# Patient Record
Sex: FEMALE | Race: WHITE | NOT HISPANIC OR LATINO | Employment: FULL TIME | URBAN - NONMETROPOLITAN AREA
[De-identification: names, ages, dates, MRNs, and addresses within clinical notes are randomized per-mention and may not be internally consistent; named-entity substitution may affect disease eponyms.]

---

## 2024-10-30 ENCOUNTER — OFFICE VISIT (OUTPATIENT)
Dept: CARDIOLOGY | Facility: CLINIC | Age: 69
End: 2024-10-30
Payer: MEDICARE

## 2024-10-30 VITALS
HEIGHT: 62 IN | WEIGHT: 116 LBS | SYSTOLIC BLOOD PRESSURE: 114 MMHG | BODY MASS INDEX: 21.35 KG/M2 | HEART RATE: 74 BPM | DIASTOLIC BLOOD PRESSURE: 64 MMHG

## 2024-10-30 DIAGNOSIS — R53.83 FATIGUE, UNSPECIFIED TYPE: ICD-10-CM

## 2024-10-30 DIAGNOSIS — Z78.9 NEVER SMOKED TOBACCO: ICD-10-CM

## 2024-10-30 DIAGNOSIS — R06.02 SHORTNESS OF BREATH: ICD-10-CM

## 2024-10-30 DIAGNOSIS — R00.2 PALPITATIONS: ICD-10-CM

## 2024-10-30 PROCEDURE — 1036F TOBACCO NON-USER: CPT | Performed by: INTERNAL MEDICINE

## 2024-10-30 PROCEDURE — 1159F MED LIST DOCD IN RCRD: CPT | Performed by: INTERNAL MEDICINE

## 2024-10-30 PROCEDURE — 99204 OFFICE O/P NEW MOD 45 MIN: CPT | Performed by: INTERNAL MEDICINE

## 2024-10-30 PROCEDURE — 93000 ELECTROCARDIOGRAM COMPLETE: CPT | Performed by: INTERNAL MEDICINE

## 2024-10-30 PROCEDURE — 3008F BODY MASS INDEX DOCD: CPT | Performed by: INTERNAL MEDICINE

## 2024-10-30 RX ORDER — BISMUTH SUBSALICYLATE 262 MG
1 TABLET,CHEWABLE ORAL DAILY
COMMUNITY

## 2024-10-30 RX ORDER — PERPHENAZINE 4 MG
1 TABLET ORAL DAILY
COMMUNITY

## 2024-10-30 RX ORDER — FAMOTIDINE 20 MG/1
20 TABLET, FILM COATED ORAL DAILY
COMMUNITY

## 2024-10-30 RX ORDER — ACETAMINOPHEN 500 MG
1 TABLET ORAL DAILY
COMMUNITY

## 2024-10-30 ASSESSMENT — ENCOUNTER SYMPTOMS
IRREGULAR HEARTBEAT: 1
DYSPNEA ON EXERTION: 1
PALPITATIONS: 1

## 2024-11-06 ENCOUNTER — HOSPITAL ENCOUNTER (OUTPATIENT)
Dept: CARDIOLOGY | Facility: CLINIC | Age: 69
Discharge: HOME | End: 2024-11-06
Payer: MEDICARE

## 2024-11-06 ENCOUNTER — APPOINTMENT (OUTPATIENT)
Dept: CARDIOLOGY | Facility: CLINIC | Age: 69
End: 2024-11-06
Payer: MEDICARE

## 2024-11-06 VITALS — HEART RATE: 59 BPM | DIASTOLIC BLOOD PRESSURE: 86 MMHG | SYSTOLIC BLOOD PRESSURE: 142 MMHG

## 2024-11-06 VITALS
DIASTOLIC BLOOD PRESSURE: 68 MMHG | BODY MASS INDEX: 21.9 KG/M2 | HEIGHT: 61 IN | SYSTOLIC BLOOD PRESSURE: 112 MMHG | WEIGHT: 116 LBS

## 2024-11-06 DIAGNOSIS — R06.02 SHORTNESS OF BREATH: ICD-10-CM

## 2024-11-06 DIAGNOSIS — R53.83 FATIGUE, UNSPECIFIED TYPE: ICD-10-CM

## 2024-11-06 DIAGNOSIS — R00.2 PALPITATIONS: ICD-10-CM

## 2024-11-06 LAB
AORTIC VALVE MEAN GRADIENT: 2 MMHG
AORTIC VALVE PEAK VELOCITY: 1.01 M/S
AV PEAK GRADIENT: 4 MMHG
AVA (PEAK VEL): 2.81 CM2
AVA (VTI): 2.7 CM2
BODY SURFACE AREA: 1.5 M2
EJECTION FRACTION APICAL 4 CHAMBER: 56
EJECTION FRACTION: 63 %
LEFT VENTRICLE INTERNAL DIMENSION DIASTOLE: 4.32 CM (ref 3.5–6)
LEFT VENTRICULAR OUTFLOW TRACT DIAMETER: 2.1 CM
LV EJECTION FRACTION BIPLANE: 58 %
MITRAL VALVE E/A RATIO: 0.82
MITRAL VALVE E/E' RATIO: 6.7
RIGHT VENTRICLE PEAK SYSTOLIC PRESSURE: 26.4 MMHG

## 2024-11-06 PROCEDURE — 93018 CV STRESS TEST I&R ONLY: CPT | Performed by: INTERNAL MEDICINE

## 2024-11-06 PROCEDURE — 93306 TTE W/DOPPLER COMPLETE: CPT

## 2024-11-06 PROCEDURE — 93016 CV STRESS TEST SUPVJ ONLY: CPT | Performed by: INTERNAL MEDICINE

## 2024-11-06 PROCEDURE — 93017 CV STRESS TEST TRACING ONLY: CPT

## 2024-11-06 PROCEDURE — 93306 TTE W/DOPPLER COMPLETE: CPT | Performed by: INTERNAL MEDICINE

## 2024-11-07 ENCOUNTER — TELEPHONE (OUTPATIENT)
Dept: CARDIOLOGY | Facility: CLINIC | Age: 69
End: 2024-11-07
Payer: MEDICARE

## 2024-11-07 NOTE — TELEPHONE ENCOUNTER
----- Message from Mary Cherry sent at 11/6/2024 11:13 PM EST -----  Let her know the stress test is ok  ----- Message -----  From: Miranda, Syngo - Cardiology Results In  Sent: 11/6/2024   7:37 PM EST  To: Mary Cherry MD

## 2024-11-07 NOTE — TELEPHONE ENCOUNTER
Result Communication    Resulted Orders   Transthoracic Echo Complete   Result Value Ref Range    AV mn grad 2 mmHg    AV pk talya 1.01 m/s    LV Biplane EF 58 %    LVOT diam 2.10 cm    MV E/A ratio 0.82     MV avg E/e' ratio 6.70     LV EF 63 %    LVIDd 4.32 cm    RVSP 26.4 mmHg    AV pk grad 4 mmHg    Aortic Valve Area by Continuity of Peak Velocity 2.81 cm2    Aortic Valve Area by Continuity of VTI 2.70 cm2    LV A4C EF 56.0     Narrative                   [address], [address] [address]          Tel [phone] Fax [phone]    TRANSTHORACIC ECHOCARDIOGRAM REPORT    Patient Name:         MENDEZ Garcia Physician:    [id number] Jaylan Cherry MD,                                                                   [facility]  Study Date:           11/6/2024            Ordering Provider:    [id number] JAYLAN CHERRY  MRN/PID:              [id number]             Fellow:  Accession#:           [id number]         Nurse:  Date of Birth/Age:    1955 / 69 years Sonographer:          SANGEETA  Gender Assigned at    F                    Additional Staff:  Birth:  Height:               154.94 cm            Admit Date:  Weight:               52.62 kg             Admission Status:  BSA / BMI:            1.50 m2 / 21.92      Department Location:  [facility]                        kg/m2                                      [facility] [place]  Blood Pressure: 112 /68 mmHg    Study Type:    TRANSTHORACIC ECHO (TTE) COMPLETE  Diagnosis/ICD: Shortness of breath-R06.02; Other fatigue-R53.83  Indication:    Palpitations, Fibromyalgia  CPT Codes:     Echo Complete w Full Doppler-49909   Study Detail: The following Echo studies were performed: 2D, M-Mode, Doppler and                color flow.       PHYSICIAN INTERPRETATION:  Left Ventricle: Left ventricular ejection  fraction is normal, by visual estimate at 60-65%. There are no regional wall motion abnormalities. The left ventricular cavity size is normal. There is mild increased septal and normal posterior left ventricular wall thickness. Spectral Doppler shows a Grade I (impaired relaxation pattern) of left ventricular diastolic filling with normal left atrial filling pressure.  Left Atrium: The left atrium is normal in size.  Right Ventricle: The right ventricle is normal in size. There is normal right ventricular global systolic function.  Right Atrium: The right atrium is normal in size.  Aortic Valve: The aortic valve is trileaflet. The aortic valve dimensionless index is 0.78. There is trace to mild aortic valve regurgitation. The peak instantaneous gradient of the aortic valve is 4 mmHg. The mean gradient of the aortic valve is 2 mmHg.  Mitral Valve: The mitral valve is normal in structure. The peak instantaneous gradient of the mitral valve is 3 mmHg. There is trace to mild mitral valve regurgitation.  Tricuspid Valve: The tricuspid valve is structurally normal. There is trace tricuspid regurgitation.  Pulmonic Valve: The pulmonic valve is structurally normal. There is no indication of pulmonic valve regurgitation.  Pericardium: No pericardial effusion noted.  Aorta: The aortic root is normal.  Pulmonary Artery: The Doppler estimated pulmonary artery diastolic pressure is 10.6 mmHg.  Systemic Veins: The inferior vena cava appears normal in size.       CONCLUSIONS:   1. Left ventricular ejection fraction is normal, by visual estimate at 60-65%.   2. There is normal right ventricular global systolic function.   3. Trace to mild mitral valve regurgitation.    QUANTITATIVE DATA SUMMARY:     2D MEASUREMENTS:           Normal Ranges:  Ao Root d:       2.80 cm   (2.0-3.7cm)  LAs:             3.30 cm   (2.7-4.0cm)  RVIDd:           3.24 cm   (0.9-3.6cm)  IVSd:            0.91 cm   (0.6-1.1cm)  LVPWd:           0.82 cm    (0.6-1.1cm)  LVIDd:           4.32 cm   (3.9-5.9cm)  LVIDs:           3.05 cm  LV Mass Index:   78.2 g/m2  LV % FS          29.4 %       LV SYSTOLIC FUNCTION BY 2D PLANIMETRY (MOD):                       Normal Ranges:  EF-A4C View:    56 % (>=55%)  EF-A2C View:    58 %  EF-Biplane:     58 %  EF-Visual:      63 %  LV EF Reported: 63 %       LV DIASTOLIC FUNCTION:           Normal Ranges:  MV Peak E:             0.61 m/s  (0.7-1.2 m/s)  MV Peak A:             0.74 m/s  (0.42-0.7 m/s)  E/A Ratio:             0.82      (1.0-2.2)  MV e'                  0.079 m/s (>8.0)  MV lateral e'          0.09 m/s  MV medial e'           0.07 m/s  E/e' Ratio:            7.67      (<8.0)       MITRAL VALVE:          Normal Ranges:  MV Vmax:      0.83 m/s (<=1.3m/s)  MV peak P.7 mmHg (<5mmHg)  MV mean P.0 mmHg (<48mmHg)       MITRAL INSUFFICIENCY:             Normal Ranges:  MR Vmax:              406.00 cm/s       AORTIC VALVE:                     Normal Ranges:  AoV Vmax:                1.01 m/s (<=1.7m/s)  AoV Peak P.1 mmHg (<20mmHg)  AoV Mean P.0 mmHg (1.7-11.5mmHg)  LVOT Max Tato:            0.82 m/s (<=1.1m/s)  AoV VTI:                 21.30 cm (18-25cm)  LVOT VTI:                16.60 cm  LVOT Diameter:           2.10 cm  (1.8-2.4cm)  AoV Area, VTI:           2.70 cm2 (2.5-5.5cm2)  AoV Area,Vmax:           2.81 cm2 (2.5-4.5cm2)  AoV Dimensionless Index: 0.78       AORTIC INSUFFICIENCY:  AI Vmax:       2.74 m/s  AI Half-time:  491 msec  AI Decel Rate: 163.00 cm/s2       TRICUSPID VALVE/RVSP:          Normal Ranges:  Peak TR Velocity:     2.42 m/s  RV Syst Pressure:     26 mmHg  (< 30mmHg)       PULMONIC VALVE:           Normal Ranges:  PV Max Tato:     0.8 m/s   (0.6-0.9m/s)  PV Max P.3 mmHg  PIEDV:          1.38 m/s  PADP:           10.6 mmHg       [id number] Mary Cherry MD, [facility]  Electronically signed on 2024 at 5:45:26 PM         ** Final **

## 2024-11-07 NOTE — TELEPHONE ENCOUNTER
Left detailed message with normal results instructed to call back with any questions or concerns.

## 2024-11-07 NOTE — TELEPHONE ENCOUNTER
Result Communication    Resulted Orders   Stress Test    Narrative                   [facility]  [address] [place] [address], [address], [place], [place] [address]          Tel [phone] Fax [phone]    Exercise Stress Test    Patient Name:      MENDEZ FUENTES           Ordering Provider:     [id number] JAYLAN CARPENTER  Study Date:        11/6/2024            Reading Physician:     [id number] A Ruiz MD  MRN/PID:           [id number]             Supervising Physician: Cody Ruiz MD  Accession#:        [id number]         Fellow:  Date of Birth/Age: 1955 / 69 years Fellow:  Gender:            F                    Nurse:                 Leonie Kumar RN  Admission Status:                       Sonographer:           NA  Height:            157.48 cm            Technologist:  Weight:            52.62 kg             Additional Staff:  BSA:               1.52 m2              Encounter#:            [id number]  BMI:               21.22 kg/m2          Patient Location:    Study Type:    STRESS TEST ONLY  Diagnosis/ICD: Palpitations-R00.2; Shortness of breath-R06.02; Other                 fatigue-R53.83  Indication:    Dyspnea  CPT Codes:     Stress Test Interpretation-30705; Stress Test Supervision-67568    Falls Risk: Low: Patient has low risk for sustaining a fall; environmental safety interventions in place.     Study Details: Correct procedure and correct patient verified verbally.       Patient Performance: The patient exercised to stage III on a Lokesh protocol for 6 minutes and 16 seconds, achieving 7.30 METS. The peak heart rate achieved was 133 bpm, which was 88 % of the age predicted target heart rate of 152 bpm. The resting blood pressure was 142/86 mmHg with a heart rate of 59 bpm. The standing blood  pressure was 136/86 mmHg with a heart rate of 62 bpm. The patient's functional capacity was average. The patient developed leg fatigue during the stress exam. The symptoms resolved with rest. The blood pressure response was normal. The test was terminated due to: leg fatigue and musculoskeletal weakness. Sinus tachycardia with nondiagnostic ST-T changes.     Double Product (HR x BP): 205.       Baseline ECG: Normal sinus rhythm.     Stress Stage Data:  +-----------------+---+------+-------+                   HR Sys BPDias BP  +-----------------+---+------+-------+  Baseline Resting 59 142   86       +-----------------+---+------+-------+  Baseline [id number] 136   86       +-----------------+---+------+-------+  Stage I          239691   82       +-----------------+---+------+-------+  Stage II         267679   78       +-----------------+---+------+-------+  Stage III        430410   80       +-----------------+---+------+-------+       Recovery ECG: The heart rate recovery was normal.     +------------+---+------+-------+              HR Sys BPDias BP  +------------+---+------+-------+  Recovery I  [id number]   80       +------------+---+------+-------+  Recovery II 99 156   74       +------------+---+------+-------+  Recovery III85 148   86       +------------+---+------+-------+  Recovery IV 77 128   84       +------------+---+------+-------+       Summary:   1. Graded exercise stress test with none diagnostic ST-T changes for ischemia.   2. No provoked chest pain or arrhythmia.   3. Appropriate hemodynamic response to exercise.   4. Fair exercise tolerance.   5. Normal heart recovery phase.   6. Patient was able to exercise for a total of 6-minute 16 seconds achieving workload of 7.3 METS and 87% of maximum predicted heart rate.   7. Adequate level of stress achieved.    [id number] Sarmad Ruiz MD  Electronically signed on 11/6/2024 at 7:37:31 PM                       ** Final **

## 2024-11-07 NOTE — TELEPHONE ENCOUNTER
----- Message from Mary Cherry sent at 11/6/2024 11:12 PM EST -----  Let her know the echo came back unremarkable  ----- Message -----  From: Interface, Syngo - Cardiology Results In  Sent: 11/6/2024   5:45 PM EST  To: Mary Cherry MD

## 2024-11-12 LAB — BODY SURFACE AREA: 1.5 M2

## 2024-11-12 PROCEDURE — 93227 XTRNL ECG REC<48 HR R&I: CPT | Performed by: INTERNAL MEDICINE

## 2024-12-16 ENCOUNTER — TELEPHONE (OUTPATIENT)
Dept: CARDIOLOGY | Facility: CLINIC | Age: 69
End: 2024-12-16
Payer: MEDICARE

## 2024-12-16 DIAGNOSIS — R92.8 ABNORMAL MAMMOGRAM: ICD-10-CM

## 2024-12-16 NOTE — TELEPHONE ENCOUNTER
Patient was told after her mammogram to call our office done at [facility]. Patient was advised she has calcium deposits and cardiologist needs updated.     Will request results.

## 2024-12-17 NOTE — TELEPHONE ENCOUNTER
Dr. Mary Cherry MD mammogram in chart for review    Clerical please send the info out for the calcium score. Thank you

## 2024-12-17 NOTE — TELEPHONE ENCOUNTER
Patient phoned office to check on status of Dr. Mary Cherry MD recommendations following anbormal mammogram. Informed her he is recommending she have a screening test called a coronary calcium score to assess further. She verbalized understanding.     Order prepped and sent to Dr. Mary Cherry MD for signature.     Paper placed on Dr. Mary Cherry MD desk for signature as well.

## 2024-12-31 ENCOUNTER — TELEPHONE (OUTPATIENT)
Dept: CARDIOLOGY | Facility: CLINIC | Age: 69
End: 2024-12-31
Payer: MEDICARE

## 2024-12-31 NOTE — TELEPHONE ENCOUNTER
Patient phoned with information for her calcium score test.  imaging in [place], [place] will be performing the patient coronary calcium score, they were able to get her in quickly while she is there for the winter.     Phone [phone]    Scheduled-01/07/2025 @ 120pm    Will watch for results, she is going to have them fax to our office for Dr. Mary Cherry MD to review.

## 2025-01-13 NOTE — TELEPHONE ENCOUNTER
Patient phoned requesting update if Calcium Score results were received from [facility] in [place], [place]. Phone number is [phone]. Will request records and send to Dr. Mary Cherry MD when obtained.

## 2025-01-14 NOTE — TELEPHONE ENCOUNTER
Pt phones back requesting to know if calcium score results have been received yet. States she is heading back to [place] tomorrow for 1 month. Informed patient results are not in yet but have been requested and will go to Dr. Mary Cherry MD upon receiving.

## 2025-01-15 DIAGNOSIS — R93.1 ELEVATED CORONARY ARTERY CALCIUM SCORE: ICD-10-CM

## 2025-01-15 DIAGNOSIS — E78.5 DYSLIPIDEMIA: ICD-10-CM

## 2025-01-15 NOTE — TELEPHONE ENCOUNTER
----- Message from Mary Cherry sent at 1/15/2025 10:22 AM EST -----  Coronary calcium score over 600, even though we did a cardiac catheterization in 2023 with no significant disease.  This indicate that we need to prevent progression, her stress test was normal.  No need for further cardiac testing I recommend starting taking baby aspirin and rosuvastatin 20 mg daily with follow-up labs in 3 months  ----- Message -----  From: Melanie Peres  Sent: 1/15/2025   9:51 AM EST  To: Mary Cherry MD

## 2025-01-15 NOTE — TELEPHONE ENCOUNTER
Phoned patient with results. Verbalized understanding. Would like lab slip mailed to home address in [place].    [address]  [address]  [place], [place] [address]

## 2025-01-17 RX ORDER — NAPROXEN SODIUM 220 MG/1
81 TABLET, FILM COATED ORAL DAILY
Start: 2025-01-17 | End: 2026-01-17

## 2025-01-17 RX ORDER — ROSUVASTATIN CALCIUM 20 MG/1
20 TABLET, COATED ORAL DAILY
Qty: 90 TABLET | Refills: 3 | Status: SHIPPED | OUTPATIENT
Start: 2025-01-17 | End: 2026-01-17

## 2025-01-25 ENCOUNTER — APPOINTMENT (OUTPATIENT)
Dept: RADIOLOGY | Facility: HOSPITAL | Age: 70
End: 2025-01-25
Payer: MEDICARE

## 2025-04-03 ENCOUNTER — TELEPHONE (OUTPATIENT)
Dept: CARDIOLOGY | Facility: CLINIC | Age: 70
End: 2025-04-03
Payer: MEDICARE

## 2025-04-03 NOTE — TELEPHONE ENCOUNTER
Patient called regarding this. Order was from 12/18/2024 phone call. See note. Patient had while in [place] , see media dated 1/15/2025.     Advised will update Dr. Mary Cherry MD   Patient wanted the test repeated after her cholesterol medication to see the numbers. Advised normally Calcium scores are not repeated after medication changes. Advised lab cholesterol lab is repeated in 3 months after started statin. Advised this is the test that is needed. No pending visits.   When do you want visit?

## 2025-04-03 NOTE — TELEPHONE ENCOUNTER
Per Helen Gutierres  I just spoke to pt to reschedule CT Calcium Score test-she is not eligible for testing at this time since she has had done in the last 5 years.      Epic is not allowing me to schedule due to this: This procedure cannot be scheduled because this patient has had a cardiac scoring procedure performed within the last 5 years    I told pt to contact the office to find out what else Dr would like her to do.

## 2025-04-10 DIAGNOSIS — E78.5 HYPERLIPIDEMIA, UNSPECIFIED HYPERLIPIDEMIA TYPE: ICD-10-CM

## 2025-04-10 RX ORDER — SULFASALAZINE 500 MG/1
1500 TABLET, DELAYED RELEASE ORAL 2 TIMES DAILY
COMMUNITY
End: 2025-04-10 | Stop reason: ENTERED-IN-ERROR

## 2025-04-10 RX ORDER — PRAVASTATIN SODIUM 20 MG/1
20 TABLET ORAL DAILY
Qty: 90 TABLET | Refills: 3 | Status: SHIPPED | OUTPATIENT
Start: 2025-04-10 | End: 2026-04-10

## 2025-04-10 NOTE — TELEPHONE ENCOUNTER
Call from patient stating she has had increasing muscle pain since starting rosuvastatin 3 months ago. Wonders if she can switch medications.     Patient is in [place], and will need Rx sent to [facility] in [place] on file.

## 2025-06-10 ENCOUNTER — APPOINTMENT (OUTPATIENT)
Dept: CARDIOLOGY | Facility: CLINIC | Age: 70
End: 2025-06-10
Payer: MEDICARE

## 2025-06-10 VITALS
BODY MASS INDEX: 21.9 KG/M2 | HEIGHT: 61 IN | WEIGHT: 116 LBS | SYSTOLIC BLOOD PRESSURE: 124 MMHG | DIASTOLIC BLOOD PRESSURE: 74 MMHG | HEART RATE: 76 BPM

## 2025-06-10 DIAGNOSIS — E78.2 MIXED HYPERLIPIDEMIA: ICD-10-CM

## 2025-06-10 DIAGNOSIS — R93.1 ELEVATED CORONARY ARTERY CALCIUM SCORE: ICD-10-CM

## 2025-06-10 DIAGNOSIS — R00.2 PALPITATIONS: ICD-10-CM

## 2025-06-10 DIAGNOSIS — E78.5 DYSLIPIDEMIA: ICD-10-CM

## 2025-06-10 DIAGNOSIS — Z78.9 NEVER SMOKED TOBACCO: ICD-10-CM

## 2025-06-10 PROCEDURE — 3008F BODY MASS INDEX DOCD: CPT | Performed by: INTERNAL MEDICINE

## 2025-06-10 PROCEDURE — 99214 OFFICE O/P EST MOD 30 MIN: CPT | Performed by: INTERNAL MEDICINE

## 2025-06-10 PROCEDURE — 1159F MED LIST DOCD IN RCRD: CPT | Performed by: INTERNAL MEDICINE

## 2025-06-10 PROCEDURE — 1036F TOBACCO NON-USER: CPT | Performed by: INTERNAL MEDICINE

## 2025-06-10 RX ORDER — EZETIMIBE 10 MG/1
10 TABLET ORAL DAILY
Qty: 90 TABLET | Refills: 3 | Status: SHIPPED | OUTPATIENT
Start: 2025-06-10 | End: 2026-06-10

## 2025-06-10 RX ORDER — ASPIRIN 81 MG/1
81 TABLET ORAL 3 TIMES WEEKLY
Start: 2025-06-11 | End: 2026-06-11

## 2025-06-10 NOTE — PATIENT INSTRUCTIONS
Please bring all medicines, vitamins, and herbal supplements with you when you come to the office.    Prescriptions will not be filled unless you are compliant with your follow up appointments or have a follow up appointment scheduled as per instruction of your physician. Refills should be requested at the time of your visit.     Can take aspirin M-W-F  Red Yeast Rice as directed.  Zetia 10 mg daily  Lab 3 months  Follow up one year

## 2025-06-10 NOTE — LETTER
Patricia 10, 2025     Latia Cantu MD   [address] [place] [address]    Patient: Ileana Simpson   YOB: 1955   Date of Visit: 6/10/2025       Dear Dr. Latia Cantu MD:    Thank you for referring Ileana Simpson to me for evaluation. Below are my notes for this consultation.  If you have questions, please do not hesitate to call me. I look forward to following your patient along with you.       Sincerely,     Mary Cherry MD      CC: No Recipients  ______________________________________________________________________________________    HPI  Patient is in the office for follow-up for elevated coronary calcium score at 612 as of January 2025.  She had normal cardiac catheterization in 2003.  Since her last visit back in October 2024 she had an echocardiogram which was normal, had a treadmill stress test which she finished 7 minutes on Lokesh protocol with normal stress test.  She had a Holter monitor which revealed insignificant arrhythmias.  Since her last visit her stress level had dropped slightly since her mother passed away, she was taking care of her mother and that was very stressful.  She is now dealing with her  who also has some health issues.  She maintains very active lifestyle.  She does have fibromyalgia and not taking any medications for that.  She tried pravastatin for hyperlipidemia that caused severe myalgias and cramping.  Her last lipid profile in April 2025 revealed LDL cholesterol of 74 mg/dL.  At that time she was taking pravastatin.  She is taking baby aspirin.  She reports no chest pain orthopnea PND or lower extremity edema.  The findings of the lipid profile and the calcium score was reviewed with the patient today.    Assessment/recommendations:     1-atypical chest pain with normal EKG and normal cardiac catheterization 2003.  Treadmill stress test October 2024 was normal after finishing 7 minutes on Lokesh protocol, coronary calcium score came at 612 in January 2025.   "Encouraged the patient to continue baby aspirin and since she is intolerant to statin utilize ezetimibe 10 mg daily and red rice yeast product.  Will follow her lipid profile.  She follows low-fat diet and maintains active lifestyle.  2-elevated coronary calcium score at 612 January 2025.  Normal treadmill stress test in October 2024, no significant CAD by cardiac catheterization 2003.  Normal echocardiogram 2004.  Will treat with aspirin and lipid-lowering agent that she can tolerate and maintain healthy lifestyle and continue annual follow-up  3-generalized fatigue and dyspnea on exertion  which is intermittent and related to stress.  Continue to monitor since cardiac testing came back unremarkable  4-GERD on H2 blockers, stable  5-history of syncope that was labeled as vasovagal with no recurrences.  6-fibromyalgia not on pharmacological therapy as a prescription but takes L-carnitine with relief of symptoms.  7-palpitations with no indication of significant arrhythmias based on 48-hour Holter monitor October 2024.  This is mostly related to stress.  Her stress level seems to be dwindling downwards     ROS         Vitals:    06/10/25 1055   BP: 124/74   BP Location: Left arm   Patient Position: Sitting   Pulse: 76   Weight: 52.6 kg (116 lb)   Height: 1.549 m (5' 1\")        Objective   Physical Exam  Constitutional:       Appearance: Normal appearance.   HENT:      Nose: Nose normal.   Neck:      Vascular: No carotid bruit.   Cardiovascular:      Rate and Rhythm: Normal rate.      Pulses: Normal pulses.      Heart sounds: Normal heart sounds.   Pulmonary:      Effort: Pulmonary effort is normal.   Abdominal:      General: Bowel sounds are normal.      Palpations: Abdomen is soft.   Musculoskeletal:         General: Normal range of motion.      Cervical back: Normal range of motion.      Right lower leg: No edema.      Left lower leg: No edema.   Skin:     General: Skin is warm and dry.   Neurological:      " General: No focal deficit present.      Mental Status: She is alert.   Psychiatric:         Mood and Affect: Mood normal.         Behavior: Behavior normal.         Thought Content: Thought content normal.         Judgment: Judgment normal.         Allergies  Fd and c red no.40, Iodinated contrast media, Pravastatin, Red dye, and Rosuvastatin     Current Medications  Current Outpatient Medications   Medication Instructions   • [START ON 6/11/2025] aspirin 81 mg, oral, 3 times weekly   • collagen-biotin-ascorbic acid (Collagen 1500 Plus C) 500 mg-800 mcg- 50 mg capsule 1 capsule, Daily   • ezetimibe (ZETIA) 10 mg, oral, Daily   • famotidine (PEPCID) 20 mg, Daily   • Lactobacillus acidophilus (Probiotic) 10 billion cell capsule 1 capsule, Daily   • multivitamin tablet 1 tablet, Daily                        Assessment/Plan   1. Elevated coronary artery calcium score  aspirin 81 mg EC tablet    Lipid Panel    ezetimibe (Zetia) 10 mg tablet    Follow Up In Cardiology    Lipid Panel      2. Mixed hyperlipidemia  ezetimibe (Zetia) 10 mg tablet      3. Palpitations        4. BMI 21.0-21.9, adult        5. Never smoked tobacco        6. Dyslipidemia  Lipid Panel    Lipid Panel               Scribe Attestation  By signing my name below, Danika SHEPARD LPN, Scribe   attest that this documentation has been prepared under the direction and in the presence of Mary Cherry MD.     Provider Attestation - Scribe documentation    All medical record entries made by the Scribe were at my direction and personally dictated by me. I have reviewed the chart and agree that the record accurately reflects my personal performance of the history, physical exam, discussion and plan.

## 2025-06-10 NOTE — PROGRESS NOTES
HPI  Patient is in the office for follow-up for elevated coronary calcium score at 612 as of January 2025.  She had normal cardiac catheterization in 2003.  Since her last visit back in October 2024 she had an echocardiogram which was normal, had a treadmill stress test which she finished 7 minutes on Lokesh protocol with normal stress test.  She had a Holter monitor which revealed insignificant arrhythmias.  Since her last visit her stress level had dropped slightly since her mother passed away, she was taking care of her mother and that was very stressful.  She is now dealing with her  who also has some health issues.  She maintains very active lifestyle.  She does have fibromyalgia and not taking any medications for that.  She tried pravastatin for hyperlipidemia that caused severe myalgias and cramping.  Her last lipid profile in April 2025 revealed LDL cholesterol of 74 mg/dL.  At that time she was taking pravastatin.  She is taking baby aspirin.  She reports no chest pain orthopnea PND or lower extremity edema.  The findings of the lipid profile and the calcium score was reviewed with the patient today.    Assessment/recommendations:     1-atypical chest pain with normal EKG and normal cardiac catheterization 2003.  Treadmill stress test October 2024 was normal after finishing 7 minutes on Lokesh protocol, coronary calcium score came at 612 in January 2025.  Encouraged the patient to continue baby aspirin and since she is intolerant to statin utilize ezetimibe 10 mg daily and red rice yeast product.  Will follow her lipid profile.  She follows low-fat diet and maintains active lifestyle.  2-elevated coronary calcium score at 612 January 2025.  Normal treadmill stress test in October 2024, no significant CAD by cardiac catheterization 2003.  Normal echocardiogram 2004.  Will treat with aspirin and lipid-lowering agent that she can tolerate and maintain healthy lifestyle and continue annual  "follow-up  3-generalized fatigue and dyspnea on exertion  which is intermittent and related to stress.  Continue to monitor since cardiac testing came back unremarkable  4-GERD on H2 blockers, stable  5-history of syncope that was labeled as vasovagal with no recurrences.  6-fibromyalgia not on pharmacological therapy as a prescription but takes L-carnitine with relief of symptoms.  7-palpitations with no indication of significant arrhythmias based on 48-hour Holter monitor October 2024.  This is mostly related to stress.  Her stress level seems to be dwindling downwards     ROS         Vitals:    06/10/25 1055   BP: 124/74   BP Location: Left arm   Patient Position: Sitting   Pulse: 76   Weight: 52.6 kg (116 lb)   Height: 1.549 m (5' 1\")        Objective   Physical Exam  Constitutional:       Appearance: Normal appearance.   HENT:      Nose: Nose normal.   Neck:      Vascular: No carotid bruit.   Cardiovascular:      Rate and Rhythm: Normal rate.      Pulses: Normal pulses.      Heart sounds: Normal heart sounds.   Pulmonary:      Effort: Pulmonary effort is normal.   Abdominal:      General: Bowel sounds are normal.      Palpations: Abdomen is soft.   Musculoskeletal:         General: Normal range of motion.      Cervical back: Normal range of motion.      Right lower leg: No edema.      Left lower leg: No edema.   Skin:     General: Skin is warm and dry.   Neurological:      General: No focal deficit present.      Mental Status: She is alert.   Psychiatric:         Mood and Affect: Mood normal.         Behavior: Behavior normal.         Thought Content: Thought content normal.         Judgment: Judgment normal.         Allergies  Fd and c red no.40, Iodinated contrast media, Pravastatin, Red dye, and Rosuvastatin     Current Medications  Current Outpatient Medications   Medication Instructions    [START ON 6/11/2025] aspirin 81 mg, oral, 3 times weekly    collagen-biotin-ascorbic acid (Collagen 1500 Plus C) 500 " mg-800 mcg- 50 mg capsule 1 capsule, Daily    ezetimibe (ZETIA) 10 mg, oral, Daily    famotidine (PEPCID) 20 mg, Daily    Lactobacillus acidophilus (Probiotic) 10 billion cell capsule 1 capsule, Daily    multivitamin tablet 1 tablet, Daily                        Assessment/Plan   1. Elevated coronary artery calcium score  aspirin 81 mg EC tablet    Lipid Panel    ezetimibe (Zetia) 10 mg tablet    Follow Up In Cardiology    Lipid Panel      2. Mixed hyperlipidemia  ezetimibe (Zetia) 10 mg tablet      3. Palpitations        4. BMI 21.0-21.9, adult        5. Never smoked tobacco        6. Dyslipidemia  Lipid Panel    Lipid Panel               Scribe Attestation  By signing my name below, [place] LPN, Scribe   attest that this documentation has been prepared under the direction and in the presence of Mary Cherry MD.     Provider Attestation - Scribe documentation    All medical record entries made by the Scribe were at my direction and personally dictated by me. I have reviewed the chart and agree that the record accurately reflects my personal performance of the history, physical exam, discussion and plan.

## 2026-06-10 ENCOUNTER — APPOINTMENT (OUTPATIENT)
Dept: CARDIOLOGY | Facility: CLINIC | Age: 71
End: 2026-06-10
Payer: MEDICARE